# Patient Record
Sex: FEMALE | Race: WHITE | NOT HISPANIC OR LATINO | URBAN - METROPOLITAN AREA
[De-identification: names, ages, dates, MRNs, and addresses within clinical notes are randomized per-mention and may not be internally consistent; named-entity substitution may affect disease eponyms.]

---

## 2019-08-21 ENCOUNTER — EMERGENCY (EMERGENCY)
Facility: HOSPITAL | Age: 64
LOS: 0 days | Discharge: HOME | End: 2019-08-21
Attending: EMERGENCY MEDICINE
Payer: COMMERCIAL

## 2019-08-21 VITALS
HEART RATE: 69 BPM | SYSTOLIC BLOOD PRESSURE: 146 MMHG | TEMPERATURE: 98 F | RESPIRATION RATE: 17 BRPM | OXYGEN SATURATION: 100 % | DIASTOLIC BLOOD PRESSURE: 92 MMHG

## 2019-08-21 VITALS
SYSTOLIC BLOOD PRESSURE: 126 MMHG | HEIGHT: 62 IN | TEMPERATURE: 98 F | WEIGHT: 149.03 LBS | DIASTOLIC BLOOD PRESSURE: 79 MMHG | OXYGEN SATURATION: 100 % | HEART RATE: 75 BPM | RESPIRATION RATE: 16 BRPM

## 2019-08-21 DIAGNOSIS — M25.512 PAIN IN LEFT SHOULDER: ICD-10-CM

## 2019-08-21 PROCEDURE — 99283 EMERGENCY DEPT VISIT LOW MDM: CPT

## 2019-08-21 PROCEDURE — 73030 X-RAY EXAM OF SHOULDER: CPT | Mod: 26,LT

## 2019-08-21 RX ORDER — KETOROLAC TROMETHAMINE 30 MG/ML
30 SYRINGE (ML) INJECTION ONCE
Refills: 0 | Status: DISCONTINUED | OUTPATIENT
Start: 2019-08-21 | End: 2019-08-21

## 2019-08-21 RX ORDER — IBUPROFEN 200 MG
1 TABLET ORAL
Qty: 30 | Refills: 0
Start: 2019-08-21 | End: 2019-08-30

## 2019-08-21 RX ADMIN — Medication 30 MILLIGRAM(S): at 09:39

## 2019-08-21 NOTE — ED ADULT NURSE NOTE - OBJECTIVE STATEMENT
Pt is a 63YOF who is here today for pain in her left shoulder, pt states that she had surgical repair of her rotator cuff but in January, her shoulder started to cause her discomfort. Pt states that she went to physical therapy for several months with relief, but today she woke up with terrible pain, pt thinks she fell asleep on her arm wrong and denies any trauma or recent injury to the area. Pt has difficulty with ROM/PROM but she is able to perform them.

## 2019-08-21 NOTE — ED PROVIDER NOTE - OBJECTIVE STATEMENT
63 year old female came to the ED because of left shoulder pain. She has had rotator cuff surgery in the past, This past January "it started to act up" and she was sent to physical therapy which helped. This am she woke up with pain to the let shoulder. No trauma, no fever, no chills. She cannot move the shoulder without pain and thinks she might have slept on it the wrong way. No chest pain, no sob,.

## 2019-08-21 NOTE — ED PROVIDER NOTE - CLINICAL SUMMARY MEDICAL DECISION MAKING FREE TEXT BOX
Pt with shoulder pain, no skin changes, no fever, no trauma, xray noted, meds given. Placed in sling for 1-2 days and pt to follow up with her ortho. Precautions reviewed.

## 2019-08-21 NOTE — ED ADULT NURSE REASSESSMENT NOTE - NS ED NURSE REASSESS COMMENT FT1
Pt able to ambulate safely and steadily w/out assistance, denies dizziness/weakness upon standing, pt placed in a sling and educated about its usage, pt discharged home and paperwork was signed, reviewed with patient. Vital Signs recorded in the EMR, pt given follow up instructions and discharge treatment plan. Pt education deemed successful at time of discharge after teach back proves proficiency. Pt has no distress at time of discharge, pt provided discharge instructions, follow up care and results reviewed by MD. Reinforced by the RN at time of discharge.

## 2019-08-21 NOTE — ED ADULT TRIAGE NOTE - CHIEF COMPLAINT QUOTE
pt woke in the middle of the night with severe pain behind left shoulder. pt with limited range of motion in left arm. + pulses. pt with hx of left rotator cuff surgery, GERD, HTN

## 2022-02-25 PROBLEM — Z00.00 ENCOUNTER FOR PREVENTIVE HEALTH EXAMINATION: Status: ACTIVE | Noted: 2022-02-25

## 2022-11-14 NOTE — ED ADULT NURSE NOTE - CAS DISCH ACCOMP BY
The patient was offered a chaperone declines..    Accompanied by: Daughter    Subjective:  Date of procedure 11/2/2022     Procedure(s):  LEFT DIRECT INGUINAL HERNIA REPAIR WITH MEDIUM PLUG AND PATCH MESH           Self
